# Patient Record
Sex: FEMALE | Race: WHITE
[De-identification: names, ages, dates, MRNs, and addresses within clinical notes are randomized per-mention and may not be internally consistent; named-entity substitution may affect disease eponyms.]

---

## 2018-04-26 ENCOUNTER — HOSPITAL ENCOUNTER (OUTPATIENT)
Dept: HOSPITAL 54 - LAB | Age: 33
Discharge: HOME | End: 2018-04-26
Attending: LEGAL MEDICINE
Payer: COMMERCIAL

## 2018-04-26 DIAGNOSIS — Z76.0: Primary | ICD-10-CM

## 2018-04-26 LAB
ALBUMIN SERPL BCP-MCNC: 3.7 G/DL (ref 3.4–5)
ALP SERPL-CCNC: 71 U/L (ref 46–116)
ALT SERPL W P-5'-P-CCNC: 23 U/L (ref 12–78)
AST SERPL W P-5'-P-CCNC: 18 U/L (ref 15–37)
BASOPHILS # BLD AUTO: 0 /CMM (ref 0–0.2)
BASOPHILS NFR BLD AUTO: 0.4 % (ref 0–2)
BILIRUB SERPL-MCNC: 0.4 MG/DL (ref 0.2–1)
BUN SERPL-MCNC: 10 MG/DL (ref 7–18)
CALCIUM SERPL-MCNC: 8.6 MG/DL (ref 8.5–10.1)
CHLORIDE SERPL-SCNC: 105 MMOL/L (ref 98–107)
CO2 SERPL-SCNC: 30 MMOL/L (ref 21–32)
CREAT SERPL-MCNC: 0.8 MG/DL (ref 0.6–1.3)
EOSINOPHIL NFR BLD AUTO: 1.2 % (ref 0–6)
GLUCOSE SERPL-MCNC: 120 MG/DL (ref 74–106)
HCT VFR BLD AUTO: 39 % (ref 33–45)
HGB BLD-MCNC: 13.1 G/DL (ref 11.5–14.8)
LYMPHOCYTES NFR BLD AUTO: 2.3 /CMM (ref 0.8–4.8)
LYMPHOCYTES NFR BLD AUTO: 24.2 % (ref 20–44)
MCHC RBC AUTO-ENTMCNC: 34 G/DL (ref 31–36)
MCV RBC AUTO: 84 FL (ref 82–100)
MONOCYTES NFR BLD AUTO: 0.5 /CMM (ref 0.1–1.3)
MONOCYTES NFR BLD AUTO: 5.4 % (ref 2–12)
NEUTROPHILS # BLD AUTO: 6.6 /CMM (ref 1.8–8.9)
NEUTROPHILS NFR BLD AUTO: 68.8 % (ref 43–81)
PLATELET # BLD AUTO: 336 /CMM (ref 150–450)
POTASSIUM SERPL-SCNC: 3.8 MMOL/L (ref 3.5–5.1)
PROT SERPL-MCNC: 7.8 G/DL (ref 6.4–8.2)
RBC # BLD AUTO: 4.62 MIL/UL (ref 4–5.2)
RDW COEFFICIENT OF VARIATION: 12 (ref 11.5–15)
SODIUM SERPL-SCNC: 140 MMOL/L (ref 136–145)
WBC NRBC COR # BLD AUTO: 9.5 K/UL (ref 4.3–11)

## 2018-05-02 ENCOUNTER — HOSPITAL ENCOUNTER (INPATIENT)
Dept: HOSPITAL 54 - ER | Age: 33
LOS: 1 days | Discharge: HOME | DRG: 313 | End: 2018-05-03
Attending: LEGAL MEDICINE | Admitting: LEGAL MEDICINE
Payer: COMMERCIAL

## 2018-05-02 VITALS — HEIGHT: 66 IN | BODY MASS INDEX: 33.43 KG/M2 | WEIGHT: 208 LBS

## 2018-05-02 VITALS — DIASTOLIC BLOOD PRESSURE: 63 MMHG | SYSTOLIC BLOOD PRESSURE: 112 MMHG

## 2018-05-02 DIAGNOSIS — Z90.49: ICD-10-CM

## 2018-05-02 DIAGNOSIS — R00.0: ICD-10-CM

## 2018-05-02 DIAGNOSIS — Z98.890: ICD-10-CM

## 2018-05-02 DIAGNOSIS — Z88.1: ICD-10-CM

## 2018-05-02 DIAGNOSIS — R07.89: Primary | ICD-10-CM

## 2018-05-02 DIAGNOSIS — Z86.718: ICD-10-CM

## 2018-05-02 DIAGNOSIS — Z79.01: ICD-10-CM

## 2018-05-02 DIAGNOSIS — D68.51: ICD-10-CM

## 2018-05-02 DIAGNOSIS — Z86.711: ICD-10-CM

## 2018-05-02 LAB
APTT PPP: 25 SEC (ref 23–34)
BASOPHILS # BLD AUTO: 0 /CMM (ref 0–0.2)
BASOPHILS NFR BLD AUTO: 0.3 % (ref 0–2)
BUN SERPL-MCNC: 12 MG/DL (ref 7–18)
CALCIUM SERPL-MCNC: 9 MG/DL (ref 8.5–10.1)
CHLORIDE SERPL-SCNC: 102 MMOL/L (ref 98–107)
CHOLEST SERPL-MCNC: 190 MG/DL (ref ?–200)
CO2 SERPL-SCNC: 27 MMOL/L (ref 21–32)
CREAT SERPL-MCNC: 0.8 MG/DL (ref 0.6–1.3)
EOSINOPHIL NFR BLD AUTO: 1.3 % (ref 0–6)
GLUCOSE SERPL-MCNC: 93 MG/DL (ref 74–106)
HCT VFR BLD AUTO: 41 % (ref 33–45)
HDLC SERPL-MCNC: 60 MG/DL (ref 40–60)
HGB BLD-MCNC: 13.6 G/DL (ref 11.5–14.8)
INR PPP: 1 (ref 0.85–1.15)
LDLC SERPL DIRECT ASSAY-MCNC: 123 MG/DL (ref 0–99)
LYMPHOCYTES NFR BLD AUTO: 2.2 /CMM (ref 0.8–4.8)
LYMPHOCYTES NFR BLD AUTO: 21.4 % (ref 20–44)
MCHC RBC AUTO-ENTMCNC: 33 G/DL (ref 31–36)
MCV RBC AUTO: 83 FL (ref 82–100)
MONOCYTES NFR BLD AUTO: 0.5 /CMM (ref 0.1–1.3)
MONOCYTES NFR BLD AUTO: 4.9 % (ref 2–12)
NEUTROPHILS # BLD AUTO: 7.4 /CMM (ref 1.8–8.9)
NEUTROPHILS NFR BLD AUTO: 72.1 % (ref 43–81)
PLATELET # BLD AUTO: 408 /CMM (ref 150–450)
POTASSIUM SERPL-SCNC: 4.2 MMOL/L (ref 3.5–5.1)
RBC # BLD AUTO: 4.98 MIL/UL (ref 4–5.2)
RDW COEFFICIENT OF VARIATION: 12.3 (ref 11.5–15)
SODIUM SERPL-SCNC: 137 MMOL/L (ref 136–145)
TRIGL SERPL-MCNC: 61 MG/DL (ref 30–150)
TROPONIN I SERPL-MCNC: < 0.017 NG/ML (ref 0–0.06)
WBC NRBC COR # BLD AUTO: 10.2 K/UL (ref 4.3–11)

## 2018-05-02 PROCEDURE — A4606 OXYGEN PROBE USED W OXIMETER: HCPCS

## 2018-05-02 PROCEDURE — Z7610: HCPCS

## 2018-05-02 RX ADMIN — DEXTROSE AND SODIUM CHLORIDE PRN MLS/HR: 5; 900 INJECTION, SOLUTION INTRAVENOUS at 16:34

## 2018-05-02 NOTE — NUR
CP SINCE 0830 AM. MIDSTERNAL CP RADIATING TO NECK, NAD NOTED, VSS, RESP EVEN 
AND UNLABORED. PT WAS PUT ON MONITOR AND HOSPITAL GOWN. WAITING FOR MD CHRISTIANSON

## 2018-05-02 NOTE — NUR
TELE/RN OPENING NOTES



PT RECEIVED AWAKE,  AT BEDSIDE. ON ROOM AIR, BREATHING EVEN AND UNLABORED. ON TELE 
MONITOR SHOWING SINUS RHYTHM WITH HR AT 95. DENIES SOB OR CHEST PAIN/DISCOMFORT AT THIS 
TIME. STATES SHE IS COMFORTABLE. IV TO LAC PATENT AND INTACT RUNNING IVF AS ORDERED. BED IN 
LOW/LOCKED POSITION WITH CALL LIGHT IN REACH. SIDE RAILS UPX2. WILL CONTINUE TO MONITOR

## 2018-05-02 NOTE — NUR
RN NOTES

PATIENT A/OX4, DENIES PAIN AT THIS TIME, NO SOB NOTED, PIV PATENT AND FLUSHES WELL, IVF 
ONGOING AND TOLERATING WELL,  AT BEDSIDE, ASSISTED WITH ADLS, NEEDS ATTENDED AND MET, 
CALL LIGHT WITHIN REACH, WILL ENDORSE TO NIGHT SHIFT FOR SUHA.

## 2018-05-02 NOTE — NUR
RN NOTES

PATIENT A/OX4, BREATHING EVEN AND UNLABORED, PATIENT DESCRIBES CHEST PAIN "AS COMES AND 
GOES, RADIATING TO HER LEFT SIDE OF THE NECK" PATIENT COMFORTABLE AT THIS TIME. PATIENT IS 
ADMITTED WITH DX OF TACHYCARDIA, UNDER DR. MON, ORDERS NOTED AND CARRIED OUT. SKIN 
ASSESSMENT DONE, SKIN INTACT. LEFT AC PIV PATENT AND FLUSHES WELL. NEEDS ATTENDED AND MET. 
WILL CONTINUE TO MONITOR.

## 2018-05-02 NOTE — NUR
TELE/RN NOTES



PT C/O OF HEADACHE. ADMINISTERED PRN TYLENOL AS ORDERED. WILL MONITOR FOR EFFECTIVENESS

## 2018-05-02 NOTE — NUR
CALLED DR MON'S OFFICE SPOKE WITH , I NOTIFIED HER THAT DR PARR NEEDS TO SPEAK WITH DR MON, SHE NOTIFIED ME THAT "DR MON DOES 
NOT LIKE TO BE INTERRUPTED AND HE WILL CALL BACK AS SOON AS HE CAN"

## 2018-05-03 VITALS — SYSTOLIC BLOOD PRESSURE: 106 MMHG | DIASTOLIC BLOOD PRESSURE: 54 MMHG

## 2018-05-03 VITALS — DIASTOLIC BLOOD PRESSURE: 67 MMHG | SYSTOLIC BLOOD PRESSURE: 103 MMHG

## 2018-05-03 LAB
BASOPHILS # BLD AUTO: 0 /CMM (ref 0–0.2)
BASOPHILS NFR BLD AUTO: 0.2 % (ref 0–2)
BUN SERPL-MCNC: 9 MG/DL (ref 7–18)
CALCIUM SERPL-MCNC: 8.1 MG/DL (ref 8.5–10.1)
CHLORIDE SERPL-SCNC: 106 MMOL/L (ref 98–107)
CHOLEST SERPL-MCNC: 139 MG/DL (ref ?–200)
CO2 SERPL-SCNC: 26 MMOL/L (ref 21–32)
CREAT SERPL-MCNC: 0.6 MG/DL (ref 0.6–1.3)
EOSINOPHIL NFR BLD AUTO: 0.3 % (ref 0–6)
GLUCOSE SERPL-MCNC: 92 MG/DL (ref 74–106)
HCT VFR BLD AUTO: 37 % (ref 33–45)
HDLC SERPL-MCNC: 49 MG/DL (ref 40–60)
HGB BLD-MCNC: 12.5 G/DL (ref 11.5–14.8)
LDLC SERPL DIRECT ASSAY-MCNC: 89 MG/DL (ref 0–99)
LYMPHOCYTES NFR BLD AUTO: 2.6 /CMM (ref 0.8–4.8)
LYMPHOCYTES NFR BLD AUTO: 36.4 % (ref 20–44)
MCHC RBC AUTO-ENTMCNC: 34 G/DL (ref 31–36)
MCV RBC AUTO: 84 FL (ref 82–100)
MONOCYTES NFR BLD AUTO: 0.6 /CMM (ref 0.1–1.3)
MONOCYTES NFR BLD AUTO: 8.4 % (ref 2–12)
NEUTROPHILS # BLD AUTO: 3.9 /CMM (ref 1.8–8.9)
NEUTROPHILS NFR BLD AUTO: 54.7 % (ref 43–81)
PLATELET # BLD AUTO: 314 /CMM (ref 150–450)
POTASSIUM SERPL-SCNC: 3.9 MMOL/L (ref 3.5–5.1)
RBC # BLD AUTO: 4.33 MIL/UL (ref 4–5.2)
RDW COEFFICIENT OF VARIATION: 13.3 (ref 11.5–15)
SODIUM SERPL-SCNC: 139 MMOL/L (ref 136–145)
TRIGL SERPL-MCNC: 50 MG/DL (ref 30–150)
WBC NRBC COR # BLD AUTO: 7.2 K/UL (ref 4.3–11)

## 2018-05-03 RX ADMIN — DEXTROSE AND SODIUM CHLORIDE PRN MLS/HR: 5; 900 INJECTION, SOLUTION INTRAVENOUS at 07:42

## 2018-05-03 NOTE — NUR
TELE RN OPENING NOTE

PATIENT IS ALERT AND ORIENTED X4. NO PAIN AT THIS TIME. NO SOB OR DISTRESS NOTED. CALL LIGHT 
WITHIN REACH AND SAFETY MEASURES IMPLEMENTED. ABLE TO COMMUNICATE NEEDS. TELE SR-90. IV 
INTACT AND PATENT NO REDNESS OR SWELLING NOTED, WITH IV FLUIDS RUNNING AT THIS TIME. WILL 
CONTINUE TO MONITOR THROUGHOUT SHIFT

## 2018-05-03 NOTE — NUR
TELE/RN NOTES



PT NOTED TO HAVE CHEST DISCOMFORT 2/10. MW=187/73, HR=71, SPO2= 100%.

SR ON THE MONITOR. PAIN LASTED FOR A COUPLE MINUTES THAN SUBSIDED. DID NOT WANT PAIN 
MEDICATION AT THIS TIME.

## 2018-05-03 NOTE — NUR
MS RN DISCHARGE NOTE

PATIENT SENT HOME IN STABLE CONDITION TO HOME. NO PAIN AT THIS TIME. NO SOB OR DISTRESS 
NOTED. ALL DUE MEDICATIONS GIVEN AS ORDERED. ALL NURSING CARE NEEDS ATTENDED TO AS NEEDED. 
IV REMOVED, SKIN INTACT. NO WOUNDS PRESENT. AMBULATORY. ALL BELONGINGS ACCOUNTED FOR AT 
DISCHARGE. DISCHARGE INSTRUCTIONS GIVEN TO PATIENT AT BEDSIDE, PATIENT ABLE TO RETURN 
INSTRUCTIONS. FOLLOW UP APPOINTMENT WITH DR. REIS IN ONE WEEK AND WITH PRIMARY MD. WORK 
NOTE GIVEN TO PATIENT. LEFT VIA PRIVATE CAR WITH .

## 2018-05-03 NOTE — NUR
TELE/RN CLOSING NOTES



PT RESTING COMFORTABLY IN BED, AROUSABLE TO NEARBY NOISE. ON ROOM AIR, BREATHING EVEN AND 
UNLABORED. ON TELE MONITOR SHOWING SINUS TACH WITH HR . DURING SHIFT HR RANGED FROM 56 
- 95. DENIES SOB OR CHEST PAIN/DISCOMFORT AT THIS TIME. IV TO LAC PATENT AND INTACT RUNNING 
IVF AS ORDERED. BED IN LOW/LOCKED POSITION WITH CALL LIGHT IN REACH. SIDE RAILS UPX2. 
ENDORSED TO DAY SHIFT RN SUHA.

## 2018-05-18 ENCOUNTER — HOSPITAL ENCOUNTER (OUTPATIENT)
Dept: HOSPITAL 54 - LAB | Age: 33
Discharge: HOME | End: 2018-05-18
Attending: LEGAL MEDICINE
Payer: COMMERCIAL

## 2018-05-18 DIAGNOSIS — Z00.01: Primary | ICD-10-CM

## 2018-05-18 DIAGNOSIS — R79.89: ICD-10-CM

## 2018-05-18 LAB
ALBUMIN SERPL BCP-MCNC: 3.6 G/DL (ref 3.4–5)
ALP SERPL-CCNC: 75 U/L (ref 46–116)
ALT SERPL W P-5'-P-CCNC: 19 U/L (ref 12–78)
AST SERPL W P-5'-P-CCNC: 20 U/L (ref 15–37)
BASOPHILS # BLD AUTO: 0.1 /CMM (ref 0–0.2)
BASOPHILS NFR BLD AUTO: 0.7 % (ref 0–2)
BILIRUB SERPL-MCNC: 0.3 MG/DL (ref 0.2–1)
BUN SERPL-MCNC: 11 MG/DL (ref 7–18)
CALCIUM SERPL-MCNC: 8.8 MG/DL (ref 8.5–10.1)
CHLORIDE SERPL-SCNC: 103 MMOL/L (ref 98–107)
CO2 SERPL-SCNC: 26 MMOL/L (ref 21–32)
CREAT SERPL-MCNC: 0.8 MG/DL (ref 0.6–1.3)
EOSINOPHIL NFR BLD AUTO: 0.8 % (ref 0–6)
GLUCOSE SERPL-MCNC: 126 MG/DL (ref 74–106)
HCT VFR BLD AUTO: 39 % (ref 33–45)
HGB BLD-MCNC: 13.5 G/DL (ref 11.5–14.8)
LYMPHOCYTES NFR BLD AUTO: 1.6 /CMM (ref 0.8–4.8)
LYMPHOCYTES NFR BLD AUTO: 11.9 % (ref 20–44)
MCHC RBC AUTO-ENTMCNC: 34 G/DL (ref 31–36)
MCV RBC AUTO: 84 FL (ref 82–100)
MONOCYTES NFR BLD AUTO: 1 /CMM (ref 0.1–1.3)
MONOCYTES NFR BLD AUTO: 7.1 % (ref 2–12)
NEUTROPHILS # BLD AUTO: 10.7 /CMM (ref 1.8–8.9)
NEUTROPHILS NFR BLD AUTO: 79.5 % (ref 43–81)
PLATELET # BLD AUTO: 336 /CMM (ref 150–450)
POTASSIUM SERPL-SCNC: 4.2 MMOL/L (ref 3.5–5.1)
PROT SERPL-MCNC: 7.6 G/DL (ref 6.4–8.2)
RBC # BLD AUTO: 4.69 MIL/UL (ref 4–5.2)
RDW COEFFICIENT OF VARIATION: 13.3 (ref 11.5–15)
SODIUM SERPL-SCNC: 137 MMOL/L (ref 136–145)
WBC NRBC COR # BLD AUTO: 13.4 K/UL (ref 4.3–11)

## 2025-04-16 ENCOUNTER — OFFICE (OUTPATIENT)
Dept: URBAN - METROPOLITAN AREA CLINIC 67 | Facility: CLINIC | Age: 40
End: 2025-04-16

## 2025-04-16 VITALS — WEIGHT: 267 LBS | DIASTOLIC BLOOD PRESSURE: 80 MMHG | SYSTOLIC BLOOD PRESSURE: 120 MMHG | HEIGHT: 66 IN

## 2025-04-16 DIAGNOSIS — R19.7 DIARRHEA (UNSPECIFIED): ICD-10-CM

## 2025-04-16 DIAGNOSIS — Z79.01 LONG TERM (CURRENT) USE OF ANTICOAGULANTS: ICD-10-CM

## 2025-04-16 DIAGNOSIS — K62.5 RECTAL BLEEDING: ICD-10-CM

## 2025-04-16 DIAGNOSIS — R19.4 ALTERED BOWEL FUNCTION: ICD-10-CM

## 2025-04-16 DIAGNOSIS — R10.30 ABDOMINAL PAIN, OTHER OR MULTIPLE SITES: ICD-10-CM

## 2025-04-16 DIAGNOSIS — K59.00 CONSTIPATION: ICD-10-CM

## 2025-04-16 PROCEDURE — 99244 OFF/OP CNSLTJ NEW/EST MOD 40: CPT | Performed by: STUDENT IN AN ORGANIZED HEALTH CARE EDUCATION/TRAINING PROGRAM

## 2025-04-16 NOTE — SERVICEHPINOTES
I had the pleasure of seeing the patient in consultation for irregular bowel movements with episodes of constipation followed by severe cramping and then loose stools and now more recently with episodes of blood in the stool.  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  She describes generally having a tendency towards irregular bowel movements but this has recently worsened and she is having more epigastric and abdominal discomfort and has also started notice blood in the stool.

## 2025-05-09 ENCOUNTER — AMBULATORY SURGICAL CENTER (OUTPATIENT)
Dept: URBAN - METROPOLITAN AREA SURGERY 42 | Facility: SURGERY | Age: 40
End: 2025-05-09

## 2025-05-09 VITALS
HEIGHT: 66 IN | OXYGEN SATURATION: 99 % | TEMPERATURE: 97.6 F | HEART RATE: 79 BPM | RESPIRATION RATE: 14 BRPM | SYSTOLIC BLOOD PRESSURE: 131 MMHG | WEIGHT: 260 LBS | DIASTOLIC BLOOD PRESSURE: 70 MMHG

## 2025-05-09 DIAGNOSIS — K59.00 CONSTIPATION, UNSPECIFIED: ICD-10-CM

## 2025-05-09 DIAGNOSIS — K62.5 HEMORRHAGE OF ANUS AND RECTUM: ICD-10-CM

## 2025-05-09 DIAGNOSIS — R19.4 CHANGE IN BOWEL HABIT: ICD-10-CM

## 2025-05-09 DIAGNOSIS — R10.30 LOWER ABDOMINAL PAIN, UNSPECIFIED: ICD-10-CM

## 2025-05-09 DIAGNOSIS — K22.89 OTHER SPECIFIED DISEASE OF ESOPHAGUS: ICD-10-CM

## 2025-05-09 DIAGNOSIS — K64.1 SECOND DEGREE HEMORRHOIDS: ICD-10-CM

## 2025-05-09 PROBLEM — K25.9 GASTRIC ULCER, UNSPECIFIED AS ACUTE OR CHRONIC, WITHOUT HEMO: Status: ACTIVE | Noted: 2025-05-09

## 2025-05-09 PROBLEM — K63.89 OTHER SPECIFIED DISEASES OF INTESTINE: Status: ACTIVE | Noted: 2025-05-09

## 2025-05-09 PROCEDURE — 43239 EGD BIOPSY SINGLE/MULTIPLE: CPT | Performed by: STUDENT IN AN ORGANIZED HEALTH CARE EDUCATION/TRAINING PROGRAM

## 2025-05-09 PROCEDURE — 45380 COLONOSCOPY AND BIOPSY: CPT | Performed by: STUDENT IN AN ORGANIZED HEALTH CARE EDUCATION/TRAINING PROGRAM

## 2025-05-09 RX ORDER — OMEPRAZOLE 40 MG/1
CAPSULE, DELAYED RELEASE ORAL
Qty: 60 | Status: ACTIVE
Start: 2025-05-09

## 2025-05-22 ENCOUNTER — OFFICE (OUTPATIENT)
Dept: URBAN - METROPOLITAN AREA CLINIC 67 | Facility: CLINIC | Age: 40
End: 2025-05-22

## 2025-05-22 VITALS — WEIGHT: 265 LBS | HEIGHT: 66 IN

## 2025-05-22 DIAGNOSIS — K25.9 GASTRIC ULCER, UNSPECIFIED AS ACUTE OR CHRONIC, WITHOUT HEMO: ICD-10-CM

## 2025-05-22 DIAGNOSIS — R10.30 ABDOMINAL PAIN, OTHER OR MULTIPLE SITES: ICD-10-CM

## 2025-05-22 DIAGNOSIS — K64.1 SECOND DEGREE HEMORRHOIDS: ICD-10-CM

## 2025-05-22 DIAGNOSIS — R19.4 ALTERED BOWEL FUNCTION: ICD-10-CM

## 2025-05-22 DIAGNOSIS — K62.5 RECTAL BLEEDING: ICD-10-CM

## 2025-05-22 DIAGNOSIS — Z12.11 SCREENING FOR COLON CANCER: ICD-10-CM

## 2025-05-22 PROCEDURE — 99214 OFFICE O/P EST MOD 30 MIN: CPT | Performed by: NURSE PRACTITIONER

## 2025-05-22 PROCEDURE — G0406 INPT/TELE FOLLOW UP 15: HCPCS | Mod: 95 | Performed by: NURSE PRACTITIONER

## 2025-05-22 NOTE — SERVICENOTES
This visit performed virtually via video and audio through face-to- face telemedicine. Patient informed and consented to proceed. Received verbal consent from the patient to discuss personal health information in a telemedicine setting. Patient informed by staff that this consult may be subject to a copay and they may receive a bill if it applies. Patient confirms they are in the [facility].

## 2025-05-22 NOTE — SERVICEHPINOTES
RAUL WRIGHT   returns today for follow-up from last visit on   5/9/2025  .    The patient is scheduled today for  telehealth visit.The clinician is connected to a secure network. The patient consents to this teleconference being a billable service. This visit used [employer] for a live, interactive audio and video telehealth visit.
dviya stokes  Previously seen by Dr Vogel on 4/16/25 w/ following notes: " had the pleasure of seeing the patient in consultation for irregular bowel movements with episodes of constipation followed by severe cramping and then loose stools and now more recently with episodes of blood in the stool. 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 She describes generally having a tendency towards irregular bowel movements but this has recently worsened and she is having more epigastric and abdominal discomfort and has also started notice blood in the stool.
divya stokes
A/P: The patient has noticed changes in bowel functioning and irregular bowel movements and abdominal pain now with episodes of blood in the stool complicated by the need for long-term anticoagulation. Given the elevated steaks and the progressive and chronic nature of symptoms I recommend an endoscopy and colonoscopy for further evaluation to exclude inflammatory or erosive or neoplastic etiologies. The patient has a regimen for when she stops the Xarelto for 48 hours for other procedures. She uses Lovenox for bridging which she will coordinate with her hematologist. She is aware of the risks of a blood clot when stopping her anticoagulation."
divya stokes
EGD/colonoscopy 5/9/25 showed acute duodenal inflammation, gastric erosions w/mild chronic gastritis and mild reflux esophagitis random colon bx were neg and grade 2 IH were appreciatedbr
divya
5/22/25 She presents today to discuss procedure results. Feeling improved since commenced on Omeprazole 40 mg qam on 5/9/25 and her abd cramps have resolved. Continues to have irregular bowel habits